# Patient Record
Sex: MALE | Race: WHITE | NOT HISPANIC OR LATINO | Employment: OTHER | URBAN - METROPOLITAN AREA
[De-identification: names, ages, dates, MRNs, and addresses within clinical notes are randomized per-mention and may not be internally consistent; named-entity substitution may affect disease eponyms.]

---

## 2019-10-03 RX ORDER — ATORVASTATIN CALCIUM 40 MG/1
TABLET, FILM COATED ORAL
Qty: 90 TABLET | Refills: 3 | Status: SHIPPED | OUTPATIENT
Start: 2019-10-03 | End: 2019-11-21 | Stop reason: SDUPTHER

## 2019-11-21 ENCOUNTER — OFFICE VISIT (OUTPATIENT)
Dept: CARDIOLOGY | Facility: CLINIC | Age: 69
End: 2019-11-21

## 2019-11-21 VITALS
WEIGHT: 197 LBS | HEIGHT: 70 IN | OXYGEN SATURATION: 100 % | HEART RATE: 62 BPM | SYSTOLIC BLOOD PRESSURE: 142 MMHG | DIASTOLIC BLOOD PRESSURE: 79 MMHG | BODY MASS INDEX: 28.2 KG/M2

## 2019-11-21 DIAGNOSIS — I10 ESSENTIAL HYPERTENSION: Primary | ICD-10-CM

## 2019-11-21 DIAGNOSIS — E78.5 DYSLIPIDEMIA: ICD-10-CM

## 2019-11-21 PROCEDURE — 93000 ELECTROCARDIOGRAM COMPLETE: CPT | Performed by: INTERNAL MEDICINE

## 2019-11-21 PROCEDURE — 99212 OFFICE O/P EST SF 10 MIN: CPT | Performed by: INTERNAL MEDICINE

## 2019-11-21 RX ORDER — MINOCYCLINE HYDROCHLORIDE 50 MG/1
CAPSULE ORAL 2 TIMES DAILY
COMMUNITY
Start: 2019-11-05

## 2019-11-21 RX ORDER — ATORVASTATIN CALCIUM 40 MG/1
TABLET, FILM COATED ORAL EVERY 24 HOURS
COMMUNITY
Start: 2017-09-28

## 2019-11-21 RX ORDER — ALBUTEROL SULFATE 90 UG/1
AEROSOL, METERED RESPIRATORY (INHALATION)
COMMUNITY
Start: 2011-11-01

## 2019-11-21 RX ORDER — ASPIRIN 81 MG/1
TABLET ORAL
COMMUNITY
Start: 2011-11-01

## 2019-11-21 RX ORDER — AMLODIPINE BESYLATE 5 MG/1
TABLET ORAL
COMMUNITY
Start: 2015-12-16

## 2019-11-21 NOTE — PROGRESS NOTES
"Visit Type:  Follow-up Visit- one year   Primary Care Provider:  Dr. Isma Mcmullen     Chief Complaint:  Lipid results in chart : no cardiac complaints .     History of Present Illness:  Dear Isma     CC: Preventive cardiovascular care, Hypertension, dyslipidemia      Mr. Derian Camargo is a very pleasant 69 years old  gentleman with no known history of coronary artery disease.  He had an abnormal myocardial perfusion imaging test study several years ago with fixed inferior wall defect but no   ischemia.  He has remained angina free.  He is a known hypertensive and has history of dyslipidemia.       He is currently on atorvastatin 40 mg daily metoprolol tartrate 25 mg  b.i.d. and amlodipine 5 mg daily.     /79 (BP Location: Right arm)   Pulse 62 Comment: EKG Sinus Rhythm  Ht 177.8 cm (70\")   Wt 89.4 kg (197 lb)   SpO2 100%   BMI 28.27 kg/m²   Indication for EKG: hypertension     Normal sinus rhythm with a rate of 62 1 PAC, sinus arrhythmia.  Nonspecific ST-T changes similar to previous EKG of 7/17/2019 SC interval 121 ms QRS duration 92 ms QT QTc 416 ms and QRS Axis       I.His last lipid profile on   On 07/02/2018 showed total cholesterol 137 LDL 81 HDL 42 triglycerides 69 and normal LFTs.      He remains asymptomatic and denies any chest pain dyspnea or palpitations.    I have given him information about cardiac wellness screening and he will think about it.  Past month/plan    1.  Hypertension stable under good control at home although he does have whitecoat hypertension and it was elevated today  Will see him in 1 year follow-up.    2.  Dyslipidemia we will check his lipid profile in 6 months.     Thanks very much for allowing us participate care Mr. Camargo             Problems: Active problems were reviewed with the patient during this visit.  Medications: Medications were reviewed with the patient during this visit.  Allergies: Allergies were reviewed with the patient during this " visit.  No Known Allergy.           Past Medical History:     Reviewed history from 2013 and no changes required:        Hypertension        Dyslipidemia        Rosacia        Psoriasis     Past Surgical History:     Reviewed history from 2017 and no changes required:        Quadricept tendon surgery-left knee        Broken Ankle-1970s        Left Knee surgery - March tendon repair         Total Hip Arthroplasty: Right 10-     Active Medications (reviewed today):  LIPITOR 40 MG ORAL TABLET (ATORVASTATIN CALCIUM) TAKE 1 TABLET BY MOUTH ONE TIME A DAY  METOPROLOL TARTRATE 25 MG ORAL TABLET (METOPROLOL TARTRATE) Take one (1) tablet by mouth twice a day  AMLODIPINE BESYLATE 5 MG ORAL TABLET (AMLODIPINE BESYLATE) Take 1 tablet by mouth daily  MULTIVITAMINS TABS (MULTIPLE VITAMIN) Take one by mouth daily for greater than 50 yr old  MINOCYCLINE  MG ORAL CAPSULE (MINOCYCLINE HCL) Take one (1) tablet by mouth twice a day  ASPIRIN 81 MG ORAL TABLET DELAYED RELEASE (ASPIRIN) Take one by mouth daily  ALBUTEROL SULFATE NEBU (ALBUTEROL SULFATE NEBU) inhaler PRN     Current Allergies (reviewed today):  No known allergies     Family History Summary:      Reviewed history Last on 2017 and no changes required:2018        General Comments - FH:  FH Heart Disease-father -  of MI age 83  FH Diabetes-mother         Social History:     Reviewed history from 2013 and no changes required:                3 children         Retired- [employer] - 37 years           Risk Factors:      Smoked Tobacco Use:  Current every day smoker     Cigarettes:  Yes -- 1/3  pack(s) per day,      Year started:  age 22  Smokeless Tobacco Use:  Never     Counseled to quit/cut down:  yes  Passive smoke exposure:  no  Drug use:  no  HIV high-risk behavior:  no  Caffeine use:  2 drinks per day  Alcohol use:  yes     Type:  rarely - beer   Exercise:  yes     Times per week:  2     Type of Exercise:  walking  -yard work and golfing  Seatbelt use:  100 %  Sun Exposure:  occasionally     Family History Risk Factors:     Family History of MI in females < 65 years old:  no     Family History of MI in males < 55 years old:  no           Review of Systems   General: denies fevers, chills, sweats, anorexia, fatigue, malaise, weight loss  Eyes: denies blurring, diplopia, irritation, discharge, vision loss, eye pain, photophobia  Ear/Nose/Throat: denies ear pain or discharge, tinnitus, decreased hearing, nasal obstruction or discharge, nosebleeds, sore throat, hoarseness, dysphagia  Cardiovascular:  hypertension.  Dyslipidemia  Respiratory: Denies cough, dyspnea, excessive sputum, hemoptysis, wheezing  Gastrointestinal: Denies nausea, vomiting, diarrhea, constipation, change in bowel habits, abdominal pain, melena, hematochezia, jaundice  Musculoskeletal: denies back pain, joint pain, joint swelling, muscle cramps, muscle weakness, stiffness, arthritis  Neurologic: denies transient paralysis, weakness, paresthesias, seizures, syncope, tremors, vertigo  Hematologic/Lymphatic: denies abnormal bruising, bleeding, enlarged lymph nodes        Physical Exam     General:      well developed, well nourished, in no acute distress.    Neck:      no masses, thyromegaly, or abnormal cervical nodes.    No JVD. No carotid bruits  Lungs:      clear bilaterally to auscultation.    Heart:      non-displaced PMI, chest non-tender; regular rate and rhythm, S1, S2 without murmurs, rubs, or gallops  Pulses:      pulses normal in all 4 extremities.    Extremities:       no edema     Diabetes Management Exam:      Foot Exam (with socks and/or shoes not present):        Pulses:           pulses normal in all 4 extremities.